# Patient Record
Sex: FEMALE | Race: WHITE | NOT HISPANIC OR LATINO | URBAN - METROPOLITAN AREA
[De-identification: names, ages, dates, MRNs, and addresses within clinical notes are randomized per-mention and may not be internally consistent; named-entity substitution may affect disease eponyms.]

---

## 2020-11-12 ENCOUNTER — OFFICE VISIT (OUTPATIENT)
Dept: URBAN - METROPOLITAN AREA CLINIC 23 | Facility: CLINIC | Age: 55
End: 2020-11-12

## 2021-02-09 ENCOUNTER — OUT OF OFFICE VISIT (OUTPATIENT)
Dept: URBAN - METROPOLITAN AREA MEDICAL CENTER 10 | Facility: MEDICAL CENTER | Age: 56
End: 2021-02-09
Payer: COMMERCIAL

## 2021-02-09 DIAGNOSIS — D12.3 ADENOMA OF TRANSVERSE COLON: ICD-10-CM

## 2021-02-09 DIAGNOSIS — K63.89 BACTERIAL OVERGROWTH SYNDROME: ICD-10-CM

## 2021-02-09 DIAGNOSIS — D12.2 ADENOMA OF ASCENDING COLON: ICD-10-CM

## 2021-02-09 DIAGNOSIS — K31.89 ACQUIRED DEFORMITY OF DUODENUM: ICD-10-CM

## 2021-02-09 DIAGNOSIS — K52.89 (LYMPHOCYTIC) MICROSCOPIC COLITIS: ICD-10-CM

## 2021-02-09 DIAGNOSIS — K92.0 BLOODY EMESIS: ICD-10-CM

## 2021-02-09 DIAGNOSIS — K92.1 BLACK STOOL: ICD-10-CM

## 2021-02-09 DIAGNOSIS — R10.84 ABDOMINAL CRAMPING, GENERALIZED: ICD-10-CM

## 2021-02-09 DIAGNOSIS — R10.13 ABDOMINAL DISCOMFORT, EPIGASTRIC: ICD-10-CM

## 2021-02-09 DIAGNOSIS — R19.5 ABNORMAL FECES: ICD-10-CM

## 2021-02-09 PROCEDURE — 45380 COLONOSCOPY AND BIOPSY: CPT | Performed by: INTERNAL MEDICINE

## 2021-02-09 PROCEDURE — 43239 EGD BIOPSY SINGLE/MULTIPLE: CPT | Performed by: INTERNAL MEDICINE

## 2021-02-09 PROCEDURE — 45385 COLONOSCOPY W/LESION REMOVAL: CPT | Performed by: INTERNAL MEDICINE

## 2021-02-09 PROCEDURE — 99204 OFFICE O/P NEW MOD 45 MIN: CPT | Performed by: INTERNAL MEDICINE

## 2021-08-17 ENCOUNTER — TELEPHONE ENCOUNTER (OUTPATIENT)
Dept: URBAN - METROPOLITAN AREA CLINIC 78 | Facility: CLINIC | Age: 56
End: 2021-08-17

## 2025-01-15 ENCOUNTER — OFFICE VISIT (OUTPATIENT)
Dept: URBAN - METROPOLITAN AREA CLINIC 23 | Facility: CLINIC | Age: 60
End: 2025-01-15

## 2025-01-15 NOTE — HPI-TODAY'S VISIT:
59-year-old female with chronic indwelling Aguilar, Parkinson's, dementia, gastroparesis, functional quadriplegia who presents today for follow-up after hospitalization.  She was admitted at [facility] 11/2024 for acute gastroenteritis/gastroparesis, chronic patient with overflow diarrhea.  Stool studies obtained by PCP in which she was started on Bactrim outpatient but diarrhea was getting worse.  GI pathogens PCR and C. difficile PCR both negative.   She was then admitted 12/2024 for recurrent UTI    Seen in ER 1/9/205 for recurrent UTI.

## 2025-01-17 ENCOUNTER — OFFICE VISIT (OUTPATIENT)
Dept: URBAN - METROPOLITAN AREA CLINIC 23 | Facility: CLINIC | Age: 60
End: 2025-01-17

## 2025-01-17 NOTE — HPI-TODAY'S VISIT:
59-year-old female with chronic indwelling Aguilar, Parkinson's, dementia, gastroparesis, functional quadriplegia who presents today for follow-up after hospitalization.  She was admitted at [facility] 11/2024 for acute gastroenteritis/gastroparesis, chronic patient with overflow diarrhea.  Stool studies obtained by PCP in which she was started on Bactrim outpatient but diarrhea was getting worse.  GI pathogens PCR and C. difficile PCR both negative.   She was then admitted 12/2024 for recurrent UTI    Seen in ER 1/9/205 for recurrent UTI.